# Patient Record
Sex: FEMALE
[De-identification: names, ages, dates, MRNs, and addresses within clinical notes are randomized per-mention and may not be internally consistent; named-entity substitution may affect disease eponyms.]

---

## 2017-06-24 ENCOUNTER — HEALTH MAINTENANCE LETTER (OUTPATIENT)
Age: 62
End: 2017-06-24

## 2023-04-13 ENCOUNTER — TELEPHONE (OUTPATIENT)
Dept: PHARMACY | Facility: CLINIC | Age: 68
End: 2023-04-13

## 2023-04-13 NOTE — TELEPHONE ENCOUNTER
MTM referral from: Patient's insurance    MTM referral outreach attempt #1 on April 13, 2023 at 12:52 PM      Outcome: Left Message    Verona Guevara PharmD, [facility]  Medication Therapy Management Provider  Pager: [phone]

## 2023-04-26 NOTE — TELEPHONE ENCOUNTER
MTM referral from: Patient's insurance     MTM referral outreach attempt #2 on 4/26/2023 12:59 PM      Outcome: Left Message    Deisy Dodd, PharmD  MTM Pharmacist  [facility]

## 2023-07-13 ENCOUNTER — TELEPHONE (OUTPATIENT)
Dept: PHARMACY | Facility: CLINIC | Age: 68
End: 2023-07-13

## 2023-07-13 NOTE — TELEPHONE ENCOUNTER
Called patient to conduct a comprehensive medication review as requested by patients [facility] insurance. Left a voicemail for a call back.    Mark Ramirez, Pharm. D., [facility]  Medication Therapy Management Pharmacist  Direct Voicemail: [phone]